# Patient Record
Sex: FEMALE | Race: WHITE | URBAN - METROPOLITAN AREA
[De-identification: names, ages, dates, MRNs, and addresses within clinical notes are randomized per-mention and may not be internally consistent; named-entity substitution may affect disease eponyms.]

---

## 2023-01-27 ENCOUNTER — TELEPHONE ENCOUNTER (OUTPATIENT)
Dept: URBAN - METROPOLITAN AREA CLINIC 105 | Facility: CLINIC | Age: 38
End: 2023-01-27

## 2023-02-01 ENCOUNTER — WEB ENCOUNTER (OUTPATIENT)
Dept: URBAN - METROPOLITAN AREA CLINIC 105 | Facility: CLINIC | Age: 38
End: 2023-02-01

## 2023-02-01 ENCOUNTER — DASHBOARD ENCOUNTERS (OUTPATIENT)
Age: 38
End: 2023-02-01

## 2023-02-01 ENCOUNTER — OFFICE VISIT (OUTPATIENT)
Dept: URBAN - METROPOLITAN AREA CLINIC 105 | Facility: CLINIC | Age: 38
End: 2023-02-01
Payer: COMMERCIAL

## 2023-02-01 VITALS
HEIGHT: 61 IN | WEIGHT: 119.6 LBS | TEMPERATURE: 97.7 F | BODY MASS INDEX: 22.58 KG/M2 | SYSTOLIC BLOOD PRESSURE: 133 MMHG | HEART RATE: 103 BPM | DIASTOLIC BLOOD PRESSURE: 95 MMHG

## 2023-02-01 DIAGNOSIS — R10.84 GENERALIZED ABDOMINAL PAIN: ICD-10-CM

## 2023-02-01 PROBLEM — 84229001: Status: ACTIVE | Noted: 2023-02-01

## 2023-02-01 PROBLEM — 156370009: Status: ACTIVE | Noted: 2023-02-01

## 2023-02-01 PROBLEM — 406506008: Status: ACTIVE | Noted: 2023-02-01

## 2023-02-01 PROBLEM — 266435005: Status: ACTIVE | Noted: 2023-02-01

## 2023-02-01 PROBLEM — 371596008: Status: ACTIVE | Noted: 2023-02-01

## 2023-02-01 PROBLEM — 195967001: Status: ACTIVE | Noted: 2023-02-01

## 2023-02-01 PROBLEM — 87522002: Status: ACTIVE | Noted: 2023-02-01

## 2023-02-01 PROCEDURE — 99204 OFFICE O/P NEW MOD 45 MIN: CPT | Performed by: INTERNAL MEDICINE

## 2023-02-01 PROCEDURE — 99245 OFF/OP CONSLTJ NEW/EST HI 55: CPT | Performed by: INTERNAL MEDICINE

## 2023-02-01 RX ORDER — SODIUM, POTASSIUM,MAG SULFATES 17.5-3.13G
177 ML SOLUTION, RECONSTITUTED, ORAL ORAL
Qty: 1 KIT | Refills: 0 | OUTPATIENT
Start: 2023-02-01 | End: 2023-02-02

## 2023-02-01 RX ORDER — DEXTROAMPHETAMINE SACCHARATE, AMPHETAMINE ASPARTATE, DEXTROAMPHETAMINE SULFATE, AND AMPHETAMINE SULFATE 1.25; 1.25; 1.25; 1.25 MG/1; MG/1; MG/1; MG/1
1 TABLET TABLET ORAL TWICE A DAY
Status: ACTIVE | COMMUNITY

## 2023-02-01 RX ORDER — QUETIAPINE 150 MG/1
1 TABLET IN THE EVENING TABLET, EXTENDED RELEASE ORAL ONCE A DAY
Status: ACTIVE | COMMUNITY

## 2023-02-01 RX ORDER — BISACODYL 5 MG
TAKE 4 TABLET, DELAYED RELEASE (ENTERIC COATED) ORAL
Qty: 4 | OUTPATIENT
Start: 2023-02-01 | End: 2023-02-02

## 2023-02-01 RX ORDER — OMEPRAZOLE 20 MG/1
1 CAPSULE 30 MINUTES BEFORE MORNING MEAL CAPSULE, DELAYED RELEASE ORAL ONCE A DAY
Status: ACTIVE | COMMUNITY

## 2023-02-01 RX ORDER — DEXTROAMPHETAMINE SULFATE, DEXTROAMPHETAMINE SACCHARATE, AMPHETAMINE SULFATE AND AMPHETAMINE ASPARTATE 5; 5; 5; 5 MG/1; MG/1; MG/1; MG/1
AS DIRECTED CAPSULE, EXTENDED RELEASE ORAL
Status: ACTIVE | COMMUNITY

## 2023-02-01 RX ORDER — ALBUTEROL SULFATE 90 UG/1
AEROSOL, METERED RESPIRATORY (INHALATION)
Qty: 18 GRAM | Status: ACTIVE | COMMUNITY

## 2023-02-01 RX ORDER — LAMOTRIGINE 150 MG/1
1 TABLET TABLET ORAL
Status: ACTIVE | COMMUNITY

## 2023-02-01 NOTE — HPI-TODAY'S VISIT:
Pt comes on referral from Dr. Enedina Vera. A copy will be sent to the referring MD. Pt tells me that in August of last year, she started to have weight loss. was having bloating and change of bowel habits. was seen by Dr. Boo Resendiz, a gastroenterologist that apparently ordered a SIBO breath test. unknown results. she was treated with several rounds of antibiotics for various infections. Started to have abdominal pain and was sent to ER at [facility]. was dx with c diff and tx with vancomycin. CT showed inflammation from the cecum to the hepatic flexure.  - Pt has developed iron deficiency anemia and has been seeing Dr. Palomino and getting iron infusions.  she is weak and tired and has to rest even to wash her hair. she has consistent but intermittent abdominal pain. her stools are "extremely irregular".  she reports that she has been passing lots of mucous for several weeks. but now she has alternating constipation and diarrhea. she has sensation of needing a bowel movement  and nothing cannot come out.  she says she intermittent melena.   she can skip 3-4 days without a stool. - she says that her periods are heavy on the first 2-3 days but then normal flow. she gets very full very quickly from just minimal food. if she eats too much she will get bloating and rumbling of the stomach.  she says that since this started last Fall, she has lost about 12 pounds total. - she was treated with Cosentyx for psoriatic arthritis for some time and was evaluated by a rheumatologist.

## 2023-02-07 LAB
ALDOLASE: 3.7
CK-BB: (no result)
CK-MB: 0
CK-MM: 100
CREATINE KINASE, TOTAL: 53
FOLATE (FOLIC ACID), SERUM: >24
IMMUNOGLOBULIN A, QN, SERUM: 388
INTERPRETATION: (no result)
T-TRANSGLUTAMINASE (TTG) IGA: <1
VITAMIN B12: 459
VITAMIN D,25-OH,TOTAL,IA: 43

## 2023-02-08 ENCOUNTER — TELEPHONE ENCOUNTER (OUTPATIENT)
Dept: URBAN - METROPOLITAN AREA CLINIC 105 | Facility: CLINIC | Age: 38
End: 2023-02-08

## 2023-02-10 ENCOUNTER — WEB ENCOUNTER (OUTPATIENT)
Dept: URBAN - METROPOLITAN AREA CLINIC 105 | Facility: CLINIC | Age: 38
End: 2023-02-10

## 2023-02-10 RX ORDER — ONDANSETRON 4 MG/1
1 TABLET TABLET, ORALLY DISINTEGRATING ORAL
Qty: 20 | Refills: 3 | OUTPATIENT

## 2023-02-14 ENCOUNTER — WEB ENCOUNTER (OUTPATIENT)
Dept: URBAN - METROPOLITAN AREA CLINIC 105 | Facility: CLINIC | Age: 38
End: 2023-02-14

## 2023-02-23 ENCOUNTER — WEB ENCOUNTER (OUTPATIENT)
Dept: URBAN - METROPOLITAN AREA CLINIC 105 | Facility: CLINIC | Age: 38
End: 2023-02-23

## 2023-02-23 RX ORDER — SODIUM PICOSULFATE, MAGNESIUM OXIDE, AND ANHYDROUS CITRIC ACID 10; 3.5; 12 MG/160ML; G/160ML; G/160ML
160 ML LIQUID ORAL
Qty: 1 KIT | Refills: 0 | OUTPATIENT
Start: 2023-02-23 | End: 2023-02-24

## 2023-03-06 ENCOUNTER — OFFICE VISIT (OUTPATIENT)
Dept: URBAN - METROPOLITAN AREA SURGERY CENTER 16 | Facility: SURGERY CENTER | Age: 38
End: 2023-03-06

## 2023-03-13 ENCOUNTER — OFFICE VISIT (OUTPATIENT)
Dept: URBAN - METROPOLITAN AREA SURGERY CENTER 16 | Facility: SURGERY CENTER | Age: 38
End: 2023-03-13

## 2023-03-15 ENCOUNTER — OFFICE VISIT (OUTPATIENT)
Dept: URBAN - METROPOLITAN AREA MEDICAL CENTER 33 | Facility: MEDICAL CENTER | Age: 38
End: 2023-03-15
Payer: COMMERCIAL

## 2023-03-15 DIAGNOSIS — R19.4 ALTERATION IN BOWEL ELIMINATION: ICD-10-CM

## 2023-03-15 DIAGNOSIS — K31.89 ACQUIRED DEFORMITY OF DUODENUM: ICD-10-CM

## 2023-03-15 PROCEDURE — 45380 COLONOSCOPY AND BIOPSY: CPT | Performed by: INTERNAL MEDICINE

## 2023-03-15 PROCEDURE — 44361 SMALL BOWEL ENDOSCOPY/BIOPSY: CPT | Performed by: INTERNAL MEDICINE

## 2023-03-15 RX ORDER — QUETIAPINE 150 MG/1
1 TABLET IN THE EVENING TABLET, EXTENDED RELEASE ORAL ONCE A DAY
Status: ACTIVE | COMMUNITY

## 2023-03-15 RX ORDER — LAMOTRIGINE 150 MG/1
1 TABLET TABLET ORAL
Status: ACTIVE | COMMUNITY

## 2023-03-15 RX ORDER — DEXTROAMPHETAMINE SULFATE, DEXTROAMPHETAMINE SACCHARATE, AMPHETAMINE SULFATE AND AMPHETAMINE ASPARTATE 5; 5; 5; 5 MG/1; MG/1; MG/1; MG/1
AS DIRECTED CAPSULE, EXTENDED RELEASE ORAL
Status: ACTIVE | COMMUNITY

## 2023-03-15 RX ORDER — ALBUTEROL SULFATE 90 UG/1
AEROSOL, METERED RESPIRATORY (INHALATION)
Qty: 18 GRAM | Status: ACTIVE | COMMUNITY

## 2023-03-15 RX ORDER — ONDANSETRON 4 MG/1
1 TABLET TABLET, ORALLY DISINTEGRATING ORAL
Qty: 20 | Refills: 3 | Status: ACTIVE | COMMUNITY

## 2023-03-15 RX ORDER — DEXTROAMPHETAMINE SACCHARATE, AMPHETAMINE ASPARTATE, DEXTROAMPHETAMINE SULFATE, AND AMPHETAMINE SULFATE 1.25; 1.25; 1.25; 1.25 MG/1; MG/1; MG/1; MG/1
1 TABLET TABLET ORAL TWICE A DAY
Status: ACTIVE | COMMUNITY

## 2023-03-15 RX ORDER — OMEPRAZOLE 20 MG/1
1 CAPSULE 30 MINUTES BEFORE MORNING MEAL CAPSULE, DELAYED RELEASE ORAL ONCE A DAY
Status: ACTIVE | COMMUNITY

## 2023-04-07 ENCOUNTER — OFFICE VISIT (OUTPATIENT)
Dept: URBAN - METROPOLITAN AREA MEDICAL CENTER 33 | Facility: MEDICAL CENTER | Age: 38
End: 2023-04-07